# Patient Record
(demographics unavailable — no encounter records)

---

## 2025-01-24 NOTE — HISTORY OF PRESENT ILLNESS
[Patient reported mammogram was normal] : Patient reported mammogram was normal [Patient reported breast sonogram was normal] : Patient reported breast sonogram was normal [Patient reported PAP Smear was abnormal] : Patient reported PAP Smear was abnormal [FreeTextEntry1] :  [id number] (Na, [place]).  59 y/o PM P2 here for annual.  New patient to practice, prior GYN care at [facility].  FMP 2018. Currently c/o hair loss, reduced energy, brain fog/forgetfulness, hot flashes, vaginal dryness, depression. Symptoms have been happening constantly since [facility]. Biggest complaints are mood, energy, and memory. Very concerned about memory loss/forgetfulness.   Elevated BP today, no h/o HTN.  H/o fibroids - wants to know if she should have her uterus removed to prevent cancer.   Last PAP at [facility] abnormal per patient, no records available for review.  Due for breast and colon CA screening.    [Mammogramdate] : 01/21 [BreastSonogramDate] : 01/21 [PapSmeardate] : 01/24 [BoneDensityDate] : n/a [ColonoscopyDate] : n/a

## 2025-01-24 NOTE — PHYSICAL EXAM
[Chaperone Present] : A chaperone was present in the examining room during all aspects of the physical examination [[id number]] : A chaperone was present during the pelvic exam. [FreeTextEntry2] : [place] [Appropriately responsive] : appropriately responsive [Alert] : alert [No Acute Distress] : no acute distress [Soft] : soft [Non-tender] : non-tender [Non-distended] : non-distended [Oriented x3] : oriented x3 [Examination Of The Breasts] : a normal appearance [No Masses] : no breast masses were palpable [Labia Majora] : normal [Labia Minora] : normal [Normal] : normal [Tenderness] : nontender [Enlarged ___ wks] : not enlarged [Uterine Adnexae] : non-palpable